# Patient Record
Sex: FEMALE | Race: WHITE | HISPANIC OR LATINO | Employment: FULL TIME | URBAN - METROPOLITAN AREA
[De-identification: names, ages, dates, MRNs, and addresses within clinical notes are randomized per-mention and may not be internally consistent; named-entity substitution may affect disease eponyms.]

---

## 2017-02-08 PROBLEM — F41.9 ANXIETY: Status: ACTIVE | Noted: 2017-02-08

## 2018-04-04 PROBLEM — Z34.90 TERM PREGNANCY: Status: ACTIVE | Noted: 2018-04-04

## 2018-06-06 PROBLEM — Z34.90 TERM PREGNANCY: Status: RESOLVED | Noted: 2018-04-04 | Resolved: 2018-06-06

## 2018-06-06 PROBLEM — I10 HYPERTENSION: Status: ACTIVE | Noted: 2018-06-06

## 2018-06-13 PROBLEM — R94.31 EKG, ABNORMAL: Status: ACTIVE | Noted: 2018-06-13

## 2018-06-18 PROBLEM — M26.609 TMJ (TEMPOROMANDIBULAR JOINT DISORDER): Status: ACTIVE | Noted: 2018-06-18

## 2018-06-20 PROBLEM — I10 UNCONTROLLED HYPERTENSION: Status: RESOLVED | Noted: 2018-06-06 | Resolved: 2018-06-20

## 2018-12-12 PROBLEM — R94.31 EKG, ABNORMAL: Status: RESOLVED | Noted: 2018-06-13 | Resolved: 2018-12-12

## 2018-12-12 PROBLEM — I10 ESSENTIAL HYPERTENSION: Status: RESOLVED | Noted: 2018-06-06 | Resolved: 2018-12-12

## 2019-01-25 PROBLEM — M54.50 LUMBAR PAIN: Status: ACTIVE | Noted: 2019-01-25

## 2019-01-25 PROBLEM — R63.5 WEIGHT GAIN: Status: ACTIVE | Noted: 2019-01-25

## 2019-02-11 PROBLEM — M41.9 SCOLIOSIS OF LUMBAR SPINE: Status: ACTIVE | Noted: 2019-02-11

## 2019-02-11 PROBLEM — M54.50 LUMBAR PAIN: Status: RESOLVED | Noted: 2019-01-25 | Resolved: 2019-02-11

## 2019-04-05 PROBLEM — Z80.0 FAMILY HISTORY OF COLON CANCER: Status: ACTIVE | Noted: 2019-04-05

## 2020-01-08 PROBLEM — M99.03 SOMATIC DYSFUNCTION OF LUMBAR REGION: Status: ACTIVE | Noted: 2020-01-08

## 2020-01-08 PROBLEM — M99.07 SOMATIC DYSFUNCTION OF BOTH UPPER EXTREMITIES: Status: ACTIVE | Noted: 2020-01-08

## 2020-01-08 PROBLEM — M99.01 SOMATIC DYSFUNCTION OF CERVICAL REGION: Status: ACTIVE | Noted: 2020-01-08

## 2020-01-08 PROBLEM — M99.00 CRANIAL SOMATIC DYSFUNCTION: Status: ACTIVE | Noted: 2020-01-08

## 2020-01-08 PROBLEM — M99.02 SOMATIC DYSFUNCTION OF THORACIC REGION: Status: ACTIVE | Noted: 2020-01-08

## 2020-01-08 PROBLEM — M99.04 SOMATIC DYSFUNCTION OF SACRAL REGION: Status: ACTIVE | Noted: 2020-01-08

## 2020-01-08 PROBLEM — M99.06 SOMATIC DYSFUNCTION OF BOTH LOWER EXTREMITIES: Status: ACTIVE | Noted: 2020-01-08

## 2020-01-08 PROBLEM — M99.08 SOMATIC DYSFUNCTION OF RIB CAGE REGION: Status: ACTIVE | Noted: 2020-01-08

## 2020-06-11 ENCOUNTER — PATIENT OUTREACH (OUTPATIENT)
Dept: OTHER | Facility: OTHER | Age: 35
End: 2020-06-11

## 2020-06-11 NOTE — LETTER
June 11, 2020     Daphne Bocanegra  [address] [facility] [address]       Dear Daphne,    Welcome to [facility]! Our goal is to make care effective, proactive and convenient by using data you send us from home to better treat your chronic conditions.              My name is Marylin Abdul, and I am your dedicated Digital Medicine clinician. As an expert in medication management, I will help ensure that the medications you are taking continue to provide the intended benefits and help you reach your goals. You can reach me directly at [phone] or by sending me a message directly through your [facility] account.      I am Melinda Shannon and I will be your health . My job is to help you identify lifestyle changes to improve your disease control. We will talk about nutrition, exercise, and other ways you may be able to adjust your current habits to better your health. Additionally, we will help ensure you are completing the tests and screenings that are necessary to help manage your conditions. You can reach me directly at [phone] or by sending me a message directly through your [facility] account.    Most importantly, YOU are at the center of this team. Together, we will work to improve your overall health and encourage you to meet your goals for a healthier lifestyle.     What we expect from YOU:  · Please take frequent home blood pressure measurements. We ask that you take at least 1 blood pressure reading per week, but more information will better help us get you know you. Be sure you rest for a few minutes before taking the reading in a quiet, comfortable place.     Be available to receive phone calls or [employer] messages, when appropriate, from your care team. Please let us know if there are any specific days or times that work best for us to reach you via phone.     Complete routine tests and screenings. Dont worry, we will help keep you on track!           What you  should expect from your Digital Medicine Care Team:   We will work with you to create a personalized plan of care and provide you with encouragement and education, including regarding lifestyle changes, that could help you manage your disease states.     We will adjust your current medications, if needed, and continue to monitor your long-term progress.     We will provide you and your physician with monthly progress reports after you have been in the program for more than 30 days.     We will send you reminders through [employer] and text messages to help ensure you do not miss any testing deadlines to help manage your disease states.    You will be able to reach us by phone or through your [facility] account by clicking our names under Care Team on the right side of the home screen.    I look forward to working with you to achieve your blood pressure goals!    We look forward to working with you to help manage your health,    Sincerely,    Your Digital Medicine Team    Please visit our websites to learn more:   · Hypertension: [email or web address]      Remember, we are not available for emergencies. If you have an emergency, please contact your doctors office directly or call [facility] ([phone] or [phone]) or [phone].

## 2020-06-11 NOTE — PROGRESS NOTES
"Digital Medicine: Health  Introduction    Introduced Daphne Bocanegra to Digital Medicine. Discussed health  role and recommended lifestyle modifications.    Patient reports she is well. Patient reports she did not receive BP cuff yet. BP reading in chart was manually entered from other BP cuff. Placed CRM. Reports  is also enrolling into the program and has not yet received BP cuff. Will place CRM for him as well.     Reports she is interested in discussing lifestyle modifications to control BP and would eventually like to reduce BP medication. Recently started on appetite suppressant. Reports maintaining healthy lifestyle habits have been a challenge during the pandemic, limited activity and poor eating habits. Endorsed efforts to "get back on track" with this. Will call back in 1 week to specifically address eating habits and health goals.    The history is provided by the patient.     HYPERTENSION  Our goal is to get BP to consistently below 130/80mmHg and make the process convenient so patient can avoid extra trips to the office. Getting your blood pressure below 130/80mmHg (definition of control) will reduce your risk for heart attack, kidney failure, stroke and death (as well as kidney failure, eye disease, & dementia)      Reviewed that the Digital Medicine care team - consisting of a clinician and a health  - will follow the most current evidence-based national guidelines for treating your condition.  The health  will focus on lifestyle modifications and motivation while the clinician will focus on medication therapy.  The care team will review all data on a regular basis and reach out as needed.      Explained that one of the key parts of the program is communication with the care team.  Asked patient to respond to outreach attempts and complete questionnaires.  Stressed importance of medication adherence.    Reviewed non-pharmacologic therapies and impact on BP.      Explained " that we expect patient to obtain several blood pressures per week at random times of day.  Instructed patient not to allow anyone else to use phone and monitoring device.  Confirmed appropriate BP monitoring technique.      Explained to patient that the digital medicine team is not available for emergencies.  Patient will call [facility] on-call ([phone] or [phone]) or [phone] if needed.      Patient's BP goal is 130/80.Patient's BP average is 114/71 mmHg, which is above goal, per 2017 ACC/AHA Hypertension Guidelines.          Last 5 Patient Entered Readings                                      Current 30 Day Average: 114/71     Recent Readings 6/10/2020    SBP (mmHg) 114    DBP (mmHg) 71            INTERVENTION(S)  reviewed monitoring technique, encouragement/support, denied further coaching and denied questions    PLAN  patient verbalizes understanding, demonstrates understanding via teach back, patient amenable to changes, patient unable to make changes at this time and continue monitoring      There are no preventive care reminders to display for this patient.    Reviewed the importance of self-monitoring, medication adherence, and that the health  can be used as a resource for lifestyle modifications to help reduce or maintain a healthy lifestyle.    Sent link to [facility] webpages and my contact information via [employer] for future questions. Follow up scheduled.         Screenings    SDOH

## 2020-06-24 ENCOUNTER — PATIENT OUTREACH (OUTPATIENT)
Dept: OTHER | Facility: OTHER | Age: 35
End: 2020-06-24

## 2020-06-24 NOTE — PROGRESS NOTES
Digital Medicine: Health  Follow-Up    Patient reports she is well, no complaints. Reports one day she decided she needed to make healthier lifestyle choices. Reports her parents were both alcoholics and did not care for himself, reports she wants to be a better example for her kids.     The history is provided by the patient.   Follow Up  Follow-up reason(s): reading review and routine education      Readings are trending down due to joined [employer] .        INTERVENTION(S)  recommended diet modifications, recommend physical activity, encouragement/support, goal setting, denied further coaching, denied resources and denied questions    PLAN  patient verbalizes understanding, demonstrates understanding via teach back, patient amenable to changes and continue monitoring      There are no preventive care reminders to display for this patient.    Last 5 Patient Entered Readings                                      Current 30 Day Average: 118/70     Recent Readings 6/23/2020 6/10/2020    SBP (mmHg) 121 114    DBP (mmHg) 69 71    Pulse 98 -                      Diet Screening   Breakfast is typically between. Banana.  Lunch is typically between. Hamburger bun with pulled pork, [employer] .    Dinner is typically between. Wheat bread, peanut butter and jelly.    Patient reports eating or drinking the following: fruit, water, frozen meals, fresh vegetables, packaged/processed foods and turkey sausage    The patient drinks 102 ounces of water per day.    She has the following dietary restrictions: weight-loss and low sodium dietShe has meals prepared by family and  .    Patient does the shopping for groceries and lets family grocery shop.  She gets groceries from the grocery store.      Patient reports she started [employer] June 15th, already lost 8 lbs. Reports her  is doing it with her as well, encouraged by his support. Reports she has been tracking her food intake with [employer]. Reports she has been reading food labels. Reports she had a coworker that has had success with [employer] so she decided to do it. Reports she typically does not add salt to her meals.     Reports she does well to maintain adequate water intake, purchases large cases of water bottles.         Physical Activity Screening   When asked if exercising, patient responded: yes    Patient participates in the following activities: walking    Reports she recently purchased a FitBit to monitor steps, HR. Reports she would like to increase her activity level. Enjoys fast pace walking. Reports efforts to stay active at work. Discussed idea to walk 30 minutes after work with her coworkers.     Set goal to walk 30 minutes per day, 3 days per week. Will assess this next call.     Intervention(s): goal setting       SDOH

## 2020-06-29 ENCOUNTER — PATIENT OUTREACH (OUTPATIENT)
Dept: OTHER | Facility: OTHER | Age: 35
End: 2020-06-29

## 2020-06-29 NOTE — PROGRESS NOTES
Called to update patient on device shipment. Sent tracking numbers via [employer] per patient request.

## 2020-07-22 ENCOUNTER — PATIENT OUTREACH (OUTPATIENT)
Dept: OTHER | Facility: OTHER | Age: 35
End: 2020-07-22

## 2020-07-30 NOTE — PROGRESS NOTES
"Digital Medicine: Health  Follow-Up    Patient reports she is well, no complaints. Denies symptoms of hypertension- HA, chest pains and SOB.     Discussed COVID-19 and importance of proper hand hygiene and social distancing. Going back into the office now.Reports she was worried that she had COVID-19, but tested negative for the virus.     Reports she would like to adjust or possibly lower her BP medication. Placed task for DM clinician to address.       The history is provided by the patient.             Reason for review: Blood pressure not at goal        Topics Covered on Call: physical activity, Diet and sleep           Diet-Change    Patient reports eating or drinking the following: Doing Weight [employer]. Reports she lost a total of 13 lbs.  has lost 17 lbs Drinking "plenty of water".         Dietary Improvements:Reports she eating pickles lately, making efforts to limit this. Reading food labels, looking for sodium content. Eating Healthy Choices meals, Fiber One and frozen fruit instead of sweets.    Dietary Indiscretions:        Additional diet details:    Physical Activity-Change      Additional physical activity details: Reports she increased her cardio, power walking.  has been going with her. Using a punching bag at her house as well.       Medication Adherence-Medication adherence was asssessed.  Patient continue taking medication as prescribed.          Substance, Sleep, Stress-change  stress-not assessed  Details:  Intervention(s):    Sleep-assessed  Details:sleeping "okay". night owl. sleeping 6-7 hours per night.   Intervention(s):    Alcohol -not assessed  Details:  Intervention(s):    Tobacco-Not Assessed  Details:  Intervention(s):        PLAN  Instructed to charge device:  Provided patient education:  Reviewed Device Techniques  Patient verbalizes understanding. Patient did not express questions or concerns and patient has contact information if needed.    Explained the " importance of self-monitoring and medication adherence. Encouraged the patient to communicate with their health  for lifestyle modifications to help improve or maintain a healthy lifestyle.        There are no preventive care reminders to display for this patient.    Last 5 Patient Entered Readings                                      Current 30 Day Average: 122/83     Recent Readings 7/29/2020 7/23/2020 7/15/2020 7/11/2020 6/30/2020    SBP (mmHg) 117 131 112 132 116    DBP (mmHg) 83 85 74 92 82    Pulse 89 99 100 95 94

## 2020-08-04 ENCOUNTER — PATIENT OUTREACH (OUTPATIENT)
Dept: OTHER | Facility: OTHER | Age: 35
End: 2020-08-04

## 2020-08-04 DIAGNOSIS — I10 ESSENTIAL HYPERTENSION: Primary | ICD-10-CM

## 2020-08-11 NOTE — PROGRESS NOTES
Digital Medicine: Clinician Introduction    Daphne Bocanegra is a 34 y.o. female who is newly enrolled in the Digital Medicine Clinic.    Called to welcome patient to [facility] and to introduce myself. Reviewed goals of therapy, medication list/allergies, monitoring requirements and technique with patient. Patient states she would like to get off the BP meds. Has joined [employer], doing 3-4 times a week of cardio exercise. Has lost 17 lbs lately. No side effects to the medications. She is watching watch what she eats and sodium.    The history is provided by the patient.      Review of patient's allergies indicates:   -- Percocet (oxycodone-acetaminophen) -- Anaphylaxis    --  Tongue swelling   -- Vicodin (hydrocodone-acetaminophen) -- Anaphylaxis  Completed Medication Reconciliation  Verified pharmacy information.    HYPERTENSION  Explained hypertension digital medicine goals including BP goal less than or equal to 130/80mmHg, improved convenience of BP management and reduced risk of heart attack, kidney failure, stroke, eye disease, dementia, and death.     Explained non-pharmacologic therapies like low salt diet and physical activity can reduce blood pressure.       Explained that we expect patient to submit several blood pressure readings per week at random times of the day, but at least 30 minutes after taking blood pressure medications. Instructed patient not to allow anyone else to use their blood pressure monitor and phone as data submitted is directly entered into medical record. Reviewed and confirmed appropriate blood pressure monitoring technique.         Reviewed signs/symptoms of hypertension (headache, changes in vision, chest pain, shortness of breath)   Reviewed signs/symptoms of hypotension (lightheaded, dizziness, weakness)     Patient's BP goal is less than or equal to 130/80. Patients BP average is 118/80 mmHg, which is at goal, per 2017 ACC/AHA Hypertension Guidelines.       Last 5  Patient Entered Readings                                      Current 30 Day Average: 118/80     Recent Readings 8/6/2020 7/29/2020 7/23/2020 7/15/2020 7/11/2020    SBP (mmHg) 113 117 131 112 132    DBP (mmHg) 78 83 85 74 92    Pulse 109 89 99 100 95              Depression Screening  Daphne Bocanegra screened negative on the depression screening.     Sleep Apnea Screening  Patient not previously diagnosed with MARLEN       Medication Affordability Screening  Patient did not answer the medication affordability questionnaires. Patient is currently not having problems affording medications          ASSESSMENT(S)  Patients BP average is 118/80 mmHg, which is at goal. Patient's BP goal is less than or equal to 130/80 per 2017 ACC/AHA Hypertension Guidelines.  Patients BP average is 118/80 mmHg, which is above goal. Patient's BP goal is less than or equal to 130/80 per 2017 ACC/AHA Hypertension Guidelines.       Hypertension Plan  Continue current therapy.    Continue current diet/physical activity routine. Patient would like to come off of BP medications. Encouraged her to continue losing weight and watching her Na intake. She was also encouraged to increase activity to be able to maintain her BP at a lower level.       Addressed any questions or concerns and patient has my contact information if needed prior to next outreach. Patient verbalizes understanding.      Explained the importance of self-monitoring and medication adherence. Encouraged the patient to communicate with their health  for lifestyle modifications to help improve or maintain a healthy lifestyle.        Sent link to [facility] webpages and my contact information via [employer] for future questions.        Explained to the patient that the Digital Medicine team is not available for emergencies. Advised patient call [facility] ([phone] or [phone]) or [phone] if needed.         There are no preventive care reminders  to display for this patient.    Current Medication Regimen:  Hypertension Medications             hydroCHLOROthiazide (HYDRODIURIL) 12.5 MG Tab TAKE 1 TABLET BY MOUTH EVERY DAY    lisinopriL (PRINIVIL,ZESTRIL) 20 MG tablet TAKE 1 TABLET BY MOUTH EVERY DAY

## 2020-08-27 ENCOUNTER — PATIENT OUTREACH (OUTPATIENT)
Dept: OTHER | Facility: OTHER | Age: 35
End: 2020-08-27

## 2020-09-14 NOTE — PROGRESS NOTES
"Digital Medicine: Health  Follow-Up    The history is provided by the patient.             Reason for review: Blood pressure not at goal        Topics Covered on Call: physical activity, Diet and sleep     Additional Follow-up details: Reports she has been losing weight over the last few weeks, current weight of 176 lbs.     Reports she still feels really motivated to continue lifestyle changes.         Diet-Change  24 hour dietary recall  Breakfast is typically between. Turkey sausage, cheese, Samy Irineo .  Lunch is typically between. Leftovers.   Dinner is typically between. Pork chop, vegetales, rice pilaf; whole wheat pasta.   Snacks are typically. Apple, side salad with boiled egg .    Patient reports eating or drinking the following: Focused on eating more fruits and vegetables, drinking a lot of water, watching sodium intake, still doing Weight Watchers. Doing really well to maintain healthy eating habits. Reports her  cooks a lot, he is also in the program.     Reports she is just trying to keep herself busy, to avoid "bored snacking".       Physical Activity-Change      Additional physical activity details: Power walking, jogging, squats, lunges, swimming/water aerobics, yoga/pilates. Working to tone her abdominal muscles. Reports her shoulder hurts, so limited activity recently.           Medication Adherence-Medication adherence was assessed.        Substance, Sleep, Stress-change  stress-not assessed  Details:  Intervention(s):    Sleep-assessed  Details:sleeping 6-7 hours per night  Intervention(s):    Alcohol -not assessed  Details:  Intervention(s):    Tobacco-Not Assessed  Details:  Intervention(s):          Continue current diet/physical activity routine.  Provided patient education.       Addressed any questions or concerns and patient has my contact information if needed prior to next outreach. Patient verbalizes understanding.      Explained the importance of self-monitoring and " medication adherence. Encouraged the patient to communicate with their health  for lifestyle modifications to help improve or maintain a healthy lifestyle.            There are no preventive care reminders to display for this patient.    Last 5 Patient Entered Readings                                      Current 30 Day Average: 121/83     Recent Readings 9/4/2020 8/27/2020 8/27/2020 8/25/2020 8/22/2020    SBP (mmHg) 119 130 123 120 118    DBP (mmHg) 84 88 77 84 85    Pulse 98 84 87 88 91

## 2020-10-26 ENCOUNTER — PATIENT OUTREACH (OUTPATIENT)
Dept: OTHER | Facility: OTHER | Age: 35
End: 2020-10-26

## 2020-11-02 ENCOUNTER — PATIENT OUTREACH (OUTPATIENT)
Dept: OTHER | Facility: OTHER | Age: 35
End: 2020-11-02

## 2020-12-01 NOTE — PROGRESS NOTES
"Digital Medicine: Health  Follow-Up    The history is provided by the patient.             Reason for review: Blood pressure at goal        Topics Covered on Call: physical activity and Diet    Additional Follow-up details: Patient reports she is well, no complaints. Denies symptoms of hypertension- HA, chest pains and SOB. BP consistently at goal- 116/77    Discussed COVID-19 and importance of proper hand hygiene and social distancing.     Patient denies further needs from the program today.                 Diet-Change    Patient reports eating or drinking the following: Reports she has continued low-sodium diet. Reports she has been choosing better snack choices, lower sugar options like fruit or salad. Reports she has been refocusing on her health for herself and her children.       Physical Activity-Change      Additional physical activity details: Still active, just not "as active" as before because she has been doing home renovations. Exercising 2-3 days per week. Reports she would still like to lose 15 lbs. Spent some time discussing this.         Medication Adherence-Medication adherence was asssessed.        Reports she is off of her diet medication, d/c 2 weeks ago. Reports she was able to lose weight, as these medications helped with appetite. Placed task for DM clinician to address.     Substance, Sleep, Stress-change  stress-assessed  Details:"up and down" stress levels with pandemic, warm bath/shower and heating pad help   Intervention(s):    Sleep-assessed  Details:sleeping well, 6-8 hours per night   Intervention(s):    Alcohol -not assessed  Details:  Intervention(s):    Tobacco-Not Assessed  Details:  Intervention(s):          Continue current diet/physical activity routine.  Provided patient education.       Addressed patient questions and patient has my contact information if needed prior to next outreach. Patient verbalizes understanding.      Explained the importance of self-monitoring and " medication adherence. Encouraged the patient to communicate with their health  for lifestyle modifications to help improve or maintain a healthy lifestyle.               There are no preventive care reminders to display for this patient.      Last 5 Patient Entered Readings                                      Current 30 Day Average: 116/77     Recent Readings 11/30/2020 11/28/2020 11/26/2020 11/24/2020 11/23/2020    SBP (mmHg) 116 115 114 116 118    DBP (mmHg) 74 74 76 74 71    Pulse 74 72 74 74 72

## 2020-12-07 ENCOUNTER — PATIENT OUTREACH (OUTPATIENT)
Dept: OTHER | Facility: OTHER | Age: 35
End: 2020-12-07

## 2020-12-07 DIAGNOSIS — I10 ESSENTIAL HYPERTENSION: Primary | ICD-10-CM

## 2020-12-07 NOTE — PROGRESS NOTES
Digital Medicine: Clinician Follow-Up    Called patient for routine follow up on blood pressure. Patient reports adherence to medication regimen daily and denies missed doses. Patient denies hypotensive s/s (lightheadedness, dizziness, nausea, fatigue); patient denies hypertensive s/s (SOB, CP, severe headaches, changes in vision, dizziness, fatigue).  Reports making better choices with diet, watching Na, checking BP daily, stopped diet medications and wanted to know if she can try to reduce the BP medication. She would like to be on as few medications as possible. Discussed what may happen when she stops HCTZ and the possibility of needing to restart the medication if her BP goes up too much.      The history is provided by the patient.   Follow-up reason(s): routine follow up.     Hypertension    Patient's blood pressure is stable.   Patient is not experiencing signs/symptoms of hypotension.  Patient is not experiencing signs/symptoms of hypertension.            Last 5 Patient Entered Readings                                      Current 30 Day Average: 116/76     Recent Readings 11/30/2020 11/28/2020 11/26/2020 11/24/2020 11/23/2020    SBP (mmHg) 116 115 114 116 118    DBP (mmHg) 74 74 76 74 71    Pulse 74 72 74 74 72                 Depression Screening  Daphne Bocanegra screened negative on the depression screening.     Sleep Apnea Screening  Patient not previously diagnosed with MARLEN       Medication Affordability Screening  Patient did not answer the medication affordability questionnaires.           ASSESSMENT(S)  Patients BP average is 116/76 mmHg, which is at goal. Patient's BP goal is less than or equal to 130/80.    Hypertension Plan  Hypertension Medication Change. D/c HCTZ if BP is above 140/90 at follow up consider increasing lisinopril to 30mg daily  Labs ordered. BMP Expected Lab Date: 12/21/2020  Continue current diet/physical activity routine.       Addressed patient questions and patient  has my contact information if needed prior to next outreach. Patient verbalizes understanding.      Explained to the patient that the Digital Medicine team is not available for emergencies. Advised patient call [facility] On [facility] ([phone] or [phone]) or [phone] if needed.            There are no preventive care reminders to display for this patient.  There are no preventive care reminders to display for this patient.      Hypertension Medications             hydroCHLOROthiazide (HYDRODIURIL) 12.5 MG Tab TAKE 1 TABLET BY MOUTH EVERY DAY    lisinopriL (PRINIVIL,ZESTRIL) 20 MG tablet TAKE 1 TABLET BY MOUTH EVERY DAY

## 2020-12-21 ENCOUNTER — PATIENT OUTREACH (OUTPATIENT)
Dept: OTHER | Facility: OTHER | Age: 35
End: 2020-12-21

## 2020-12-28 ENCOUNTER — PATIENT MESSAGE (OUTPATIENT)
Dept: OTHER | Facility: OTHER | Age: 35
End: 2020-12-28

## 2020-12-28 NOTE — PROGRESS NOTES
Digital Medicine: Clinician Follow-Up    The history is provided by the patient.   Follow-up reason(s): lab follow up and routine follow up.     Hypertension    Readings are trending up due to lifestyle change and stress - she recently lost her grandmother.    Patient is not experiencing signs/symptoms of hypotension.  Patient is not experiencing signs/symptoms of hypertension.            Last 5 Patient Entered Readings                                      Current 30 Day Average: 113/75     Recent Readings 12/20/2020 12/19/2020 12/18/2020 12/17/2020 12/16/2020    SBP (mmHg) 112 116 113 110 116    DBP (mmHg) 78 79 78 74 78    Pulse 92 82 86 88 85                 Depression Screening  Did not address depression screening.    Sleep Apnea Screening    Did not address sleep apnea screening.     Medication Affordability Screening  Did not address medication affordability screening.           ASSESSMENT(S)  Patients BP average is 113/75 mmHg, which is at goal. Patient's BP goal is less than or equal to 130/80.    Hypertension Plan  Continue current therapy. Consider reducing to lisinopril 10mg daily when stressful period passes  Continue current diet/physical activity routine.  Patient will also send the name of the birth control pill that she is on via [employer]     Addressed patient questions and patient has my contact information if needed prior to next outreach. Patient verbalizes understanding.      Explained to the patient that the Digital Medicine team is not available for emergencies. Advised patient call [facility] ([phone] or [phone]) or [phone] if needed.            There are no preventive care reminders to display for this patient.  There are no preventive care reminders to display for this patient.      Hypertension Medications             lisinopriL (PRINIVIL,ZESTRIL) 20 MG tablet TAKE 1 TABLET BY MOUTH EVERY DAY

## 2020-12-31 ENCOUNTER — CLINICAL SUPPORT (OUTPATIENT)
Dept: URGENT CARE | Facility: CLINIC | Age: 35
End: 2020-12-31
Payer: COMMERCIAL

## 2020-12-31 DIAGNOSIS — R52 BODY ACHES: Primary | ICD-10-CM

## 2020-12-31 LAB
CTP QC/QA: YES
SARS-COV-2 RDRP RESP QL NAA+PROBE: NEGATIVE

## 2020-12-31 PROCEDURE — U0002 COVID-19 LAB TEST NON-CDC: HCPCS | Mod: QW,S$GLB,, | Performed by: FAMILY MEDICINE

## 2020-12-31 PROCEDURE — U0002: ICD-10-PCS | Mod: QW,S$GLB,, | Performed by: FAMILY MEDICINE

## 2021-03-03 ENCOUNTER — PATIENT MESSAGE (OUTPATIENT)
Dept: OTHER | Facility: OTHER | Age: 36
End: 2021-03-03

## 2021-04-21 ENCOUNTER — PATIENT MESSAGE (OUTPATIENT)
Dept: OTHER | Facility: OTHER | Age: 36
End: 2021-04-21

## 2021-07-20 ENCOUNTER — PATIENT MESSAGE (OUTPATIENT)
Dept: OTHER | Facility: OTHER | Age: 36
End: 2021-07-20

## 2022-07-06 PROBLEM — R09.81 NASAL CONGESTION: Status: ACTIVE | Noted: 2022-07-06

## 2022-07-06 PROBLEM — H69.93 DYSFUNCTION OF BOTH EUSTACHIAN TUBES: Status: ACTIVE | Noted: 2022-07-06

## 2022-07-06 PROBLEM — H90.12 CONDUCTIVE HEARING LOSS IN LEFT EAR: Status: ACTIVE | Noted: 2022-07-06

## 2022-07-06 PROBLEM — J30.1 ALLERGIC RHINITIS DUE TO POLLEN: Status: ACTIVE | Noted: 2022-07-06

## 2022-07-06 PROBLEM — J34.3 HYPERTROPHY OF NASAL TURBINATES: Status: ACTIVE | Noted: 2022-07-06

## 2022-07-06 PROBLEM — J35.2 HYPERTROPHY OF ADENOIDS ALONE: Status: ACTIVE | Noted: 2022-07-06

## 2022-07-06 PROBLEM — J34.2 DEVIATED NASAL SEPTUM: Status: ACTIVE | Noted: 2022-07-06

## 2023-08-21 PROBLEM — R07.9 CHEST PAIN: Status: ACTIVE | Noted: 2023-08-21

## 2023-10-17 ENCOUNTER — PATIENT MESSAGE (OUTPATIENT)
Dept: PSYCHIATRY | Facility: CLINIC | Age: 38
End: 2023-10-17
Payer: COMMERCIAL

## 2023-11-28 ENCOUNTER — TELEPHONE (OUTPATIENT)
Dept: PSYCHIATRY | Facility: CLINIC | Age: 38
End: 2023-11-28
Payer: COMMERCIAL

## 2023-12-05 ENCOUNTER — OFFICE VISIT (OUTPATIENT)
Dept: PSYCHIATRY | Facility: CLINIC | Age: 38
End: 2023-12-05
Payer: COMMERCIAL

## 2023-12-05 VITALS
HEIGHT: 64 IN | WEIGHT: 146.25 LBS | HEART RATE: 76 BPM | BODY MASS INDEX: 24.97 KG/M2 | SYSTOLIC BLOOD PRESSURE: 152 MMHG | DIASTOLIC BLOOD PRESSURE: 108 MMHG

## 2023-12-05 DIAGNOSIS — F32.A DEPRESSION, UNSPECIFIED DEPRESSION TYPE: ICD-10-CM

## 2023-12-05 DIAGNOSIS — F41.9 ANXIETY: ICD-10-CM

## 2023-12-05 DIAGNOSIS — F41.1 GAD (GENERALIZED ANXIETY DISORDER): Primary | ICD-10-CM

## 2023-12-05 PROCEDURE — 3077F SYST BP >= 140 MM HG: CPT | Mod: CPTII,S$GLB,, | Performed by: NURSE PRACTITIONER

## 2023-12-05 PROCEDURE — 3080F DIAST BP >= 90 MM HG: CPT | Mod: CPTII,S$GLB,, | Performed by: NURSE PRACTITIONER

## 2023-12-05 PROCEDURE — 3077F PR MOST RECENT SYSTOLIC BLOOD PRESSURE >= 140 MM HG: ICD-10-PCS | Mod: CPTII,S$GLB,, | Performed by: NURSE PRACTITIONER

## 2023-12-05 PROCEDURE — 99999 PR PBB SHADOW E&M-EST. PATIENT-LVL IV: CPT | Mod: PBBFAC,,, | Performed by: NURSE PRACTITIONER

## 2023-12-05 PROCEDURE — 3080F PR MOST RECENT DIASTOLIC BLOOD PRESSURE >= 90 MM HG: ICD-10-PCS | Mod: CPTII,S$GLB,, | Performed by: NURSE PRACTITIONER

## 2023-12-05 PROCEDURE — 1159F MED LIST DOCD IN RCRD: CPT | Mod: CPTII,S$GLB,, | Performed by: NURSE PRACTITIONER

## 2023-12-05 PROCEDURE — 1159F PR MEDICATION LIST DOCUMENTED IN MEDICAL RECORD: ICD-10-PCS | Mod: CPTII,S$GLB,, | Performed by: NURSE PRACTITIONER

## 2023-12-05 PROCEDURE — 90792 PR PSYCHIATRIC DIAGNOSTIC EVALUATION W/MEDICAL SERVICES: ICD-10-PCS | Mod: S$GLB,,, | Performed by: NURSE PRACTITIONER

## 2023-12-05 PROCEDURE — 99999 PR PBB SHADOW E&M-EST. PATIENT-LVL IV: ICD-10-PCS | Mod: PBBFAC,,, | Performed by: NURSE PRACTITIONER

## 2023-12-05 PROCEDURE — 1160F PR REVIEW ALL MEDS BY PRESCRIBER/CLIN PHARMACIST DOCUMENTED: ICD-10-PCS | Mod: CPTII,S$GLB,, | Performed by: NURSE PRACTITIONER

## 2023-12-05 PROCEDURE — 90792 PSYCH DIAG EVAL W/MED SRVCS: CPT | Mod: S$GLB,,, | Performed by: NURSE PRACTITIONER

## 2023-12-05 PROCEDURE — 1160F RVW MEDS BY RX/DR IN RCRD: CPT | Mod: CPTII,S$GLB,, | Performed by: NURSE PRACTITIONER

## 2023-12-05 RX ORDER — FLUOXETINE HYDROCHLORIDE 20 MG/1
20 CAPSULE ORAL DAILY
Qty: 30 CAPSULE | Refills: 2 | Status: SHIPPED | OUTPATIENT
Start: 2023-12-05 | End: 2024-01-13
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
## 2023-12-08 ENCOUNTER — PATIENT MESSAGE (OUTPATIENT)
Dept: PSYCHIATRY | Facility: CLINIC | Age: 38
End: 2023-12-08
Payer: COMMERCIAL

## 2024-01-05 ENCOUNTER — PATIENT MESSAGE (OUTPATIENT)
Dept: PSYCHIATRY | Facility: CLINIC | Age: 39
End: 2024-01-05
Payer: COMMERCIAL

## 2024-01-12 ENCOUNTER — TELEPHONE (OUTPATIENT)
Dept: PSYCHIATRY | Facility: CLINIC | Age: 39
End: 2024-01-12
Payer: COMMERCIAL

## 2024-01-13 RX ORDER — FLUOXETINE HYDROCHLORIDE 20 MG/1
20 CAPSULE ORAL DAILY
Qty: 90 CAPSULE | Refills: 0 | Status: SHIPPED | OUTPATIENT
Start: 2024-01-13 | End: 2024-01-17 | Stop reason: ALTCHOICE

## 2024-01-17 ENCOUNTER — TELEPHONE (OUTPATIENT)
Dept: PSYCHIATRY | Facility: CLINIC | Age: 39
End: 2024-01-17
Payer: COMMERCIAL

## 2024-01-17 ENCOUNTER — OFFICE VISIT (OUTPATIENT)
Dept: PSYCHIATRY | Facility: CLINIC | Age: 39
End: 2024-01-17
Payer: COMMERCIAL

## 2024-01-17 DIAGNOSIS — F41.1 GAD (GENERALIZED ANXIETY DISORDER): Primary | ICD-10-CM

## 2024-01-17 DIAGNOSIS — F33.1 MODERATE EPISODE OF RECURRENT MAJOR DEPRESSIVE DISORDER: ICD-10-CM

## 2024-01-17 PROCEDURE — 99214 OFFICE O/P EST MOD 30 MIN: CPT | Mod: 95,,, | Performed by: NURSE PRACTITIONER

## 2024-01-17 RX ORDER — DESVENLAFAXINE SUCCINATE 25 MG/1
25 TABLET, EXTENDED RELEASE ORAL DAILY
Qty: 30 TABLET | Refills: 0 | Status: SHIPPED | OUTPATIENT
Start: 2024-01-17 | End: 2024-02-09
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
## 2024-02-09 ENCOUNTER — PATIENT MESSAGE (OUTPATIENT)
Dept: PSYCHIATRY | Facility: CLINIC | Age: 39
End: 2024-02-09
Payer: COMMERCIAL

## 2024-02-09 RX ORDER — DESVENLAFAXINE SUCCINATE 25 MG/1
1 TABLET, EXTENDED RELEASE ORAL
Qty: 90 TABLET | Refills: 1 | Status: SHIPPED | OUTPATIENT
Start: 2024-02-09 | End: 2024-02-27

## 2024-02-09 NOTE — TELEPHONE ENCOUNTER
Last ordered: 3 weeks ago (1/17/2024) by Hood Covington NP     Last refill: 1/17/2024        Pharmacy comment: REQUEST FOR 90 DAYS PRESCRIPTION.       Nov none   Lov 1/17/24

## 2024-02-27 ENCOUNTER — PATIENT MESSAGE (OUTPATIENT)
Dept: PSYCHIATRY | Facility: CLINIC | Age: 39
End: 2024-02-27
Payer: COMMERCIAL

## 2024-02-27 ENCOUNTER — OFFICE VISIT (OUTPATIENT)
Dept: PSYCHIATRY | Facility: CLINIC | Age: 39
End: 2024-02-27
Payer: COMMERCIAL

## 2024-02-27 DIAGNOSIS — F41.1 GAD (GENERALIZED ANXIETY DISORDER): ICD-10-CM

## 2024-02-27 DIAGNOSIS — F33.1 MODERATE EPISODE OF RECURRENT MAJOR DEPRESSIVE DISORDER: Primary | ICD-10-CM

## 2024-02-27 PROCEDURE — 99214 OFFICE O/P EST MOD 30 MIN: CPT | Mod: 95,,, | Performed by: NURSE PRACTITIONER

## 2024-02-27 RX ORDER — DESVENLAFAXINE SUCCINATE 50 MG/1
50 TABLET, EXTENDED RELEASE ORAL DAILY
Qty: 30 TABLET | Refills: 2 | Status: SHIPPED | OUTPATIENT
Start: 2024-02-27 | End: 2024-04-23
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
## 2024-04-23 RX ORDER — DESVENLAFAXINE SUCCINATE 50 MG/1
50 TABLET, EXTENDED RELEASE ORAL
Qty: 90 TABLET | Refills: 1 | Status: SHIPPED | OUTPATIENT
Start: 2024-04-23

## 2024-05-27 ENCOUNTER — TELEPHONE (OUTPATIENT)
Dept: PSYCHIATRY | Facility: CLINIC | Age: 39
End: 2024-05-27
Payer: COMMERCIAL

## 2024-05-29 ENCOUNTER — OFFICE VISIT (OUTPATIENT)
Dept: PSYCHIATRY | Facility: CLINIC | Age: 39
End: 2024-05-29
Payer: COMMERCIAL

## 2024-05-29 DIAGNOSIS — F41.1 GAD (GENERALIZED ANXIETY DISORDER): ICD-10-CM

## 2024-05-29 DIAGNOSIS — F33.1 MODERATE EPISODE OF RECURRENT MAJOR DEPRESSIVE DISORDER: Primary | ICD-10-CM

## 2024-05-29 PROCEDURE — 1160F RVW MEDS BY RX/DR IN RCRD: CPT | Mod: CPTII,95,, | Performed by: NURSE PRACTITIONER

## 2024-05-29 PROCEDURE — 99214 OFFICE O/P EST MOD 30 MIN: CPT | Mod: 95,,, | Performed by: NURSE PRACTITIONER

## 2024-05-29 PROCEDURE — 1159F MED LIST DOCD IN RCRD: CPT | Mod: CPTII,95,, | Performed by: NURSE PRACTITIONER

## 2024-05-29 RX ORDER — DESVENLAFAXINE SUCCINATE 25 MG/1
25 TABLET, EXTENDED RELEASE ORAL DAILY
Qty: 30 TABLET | Refills: 2 | Status: SHIPPED | OUTPATIENT
Start: 2024-05-29
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
## 2024-06-01 ENCOUNTER — PATIENT MESSAGE (OUTPATIENT)
Dept: PSYCHIATRY | Facility: CLINIC | Age: 39
End: 2024-06-01
Payer: COMMERCIAL

## 2024-06-27 ENCOUNTER — OFFICE VISIT (OUTPATIENT)
Dept: PSYCHIATRY | Facility: CLINIC | Age: 39
End: 2024-06-27
Payer: COMMERCIAL

## 2024-06-27 VITALS
HEIGHT: 64 IN | BODY MASS INDEX: 27.68 KG/M2 | HEART RATE: 58 BPM | DIASTOLIC BLOOD PRESSURE: 95 MMHG | WEIGHT: 162.13 LBS | SYSTOLIC BLOOD PRESSURE: 151 MMHG

## 2024-06-27 DIAGNOSIS — F41.1 GAD (GENERALIZED ANXIETY DISORDER): ICD-10-CM

## 2024-06-27 DIAGNOSIS — F33.1 MODERATE EPISODE OF RECURRENT MAJOR DEPRESSIVE DISORDER: Primary | ICD-10-CM

## 2024-06-27 PROCEDURE — 99999 PR PBB SHADOW E&M-EST. PATIENT-LVL IV: CPT | Mod: PBBFAC,,, | Performed by: NURSE PRACTITIONER

## 2024-06-27 PROCEDURE — 1160F RVW MEDS BY RX/DR IN RCRD: CPT | Mod: CPTII,S$GLB,, | Performed by: NURSE PRACTITIONER

## 2024-06-27 PROCEDURE — 3008F BODY MASS INDEX DOCD: CPT | Mod: CPTII,S$GLB,, | Performed by: NURSE PRACTITIONER

## 2024-06-27 PROCEDURE — 3080F DIAST BP >= 90 MM HG: CPT | Mod: CPTII,S$GLB,, | Performed by: NURSE PRACTITIONER

## 2024-06-27 PROCEDURE — 3077F SYST BP >= 140 MM HG: CPT | Mod: CPTII,S$GLB,, | Performed by: NURSE PRACTITIONER

## 2024-06-27 PROCEDURE — 1159F MED LIST DOCD IN RCRD: CPT | Mod: CPTII,S$GLB,, | Performed by: NURSE PRACTITIONER

## 2024-06-27 PROCEDURE — 99214 OFFICE O/P EST MOD 30 MIN: CPT | Mod: S$GLB,,, | Performed by: NURSE PRACTITIONER

## 2024-06-27 RX ORDER — VILAZODONE HYDROCHLORIDE 20 MG/1
TABLET ORAL
Qty: 30 TABLET | Refills: 2 | Status: SHIPPED | OUTPATIENT
Start: 2024-06-27

## 2024-06-27 RX ORDER — VILAZODONE HYDROCHLORIDE 10 MG/1
10 TABLET ORAL DAILY
Qty: 7 TABLET | Refills: 0 | Status: SHIPPED | OUTPATIENT
Start: 2024-06-27 | End: 2025-06-27
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
## 2024-08-06 ENCOUNTER — PATIENT MESSAGE (OUTPATIENT)
Dept: PSYCHIATRY | Facility: CLINIC | Age: 39
End: 2024-08-06
Payer: COMMERCIAL

## 2024-08-08 ENCOUNTER — OFFICE VISIT (OUTPATIENT)
Dept: PSYCHIATRY | Facility: CLINIC | Age: 39
End: 2024-08-08
Payer: COMMERCIAL

## 2024-08-08 DIAGNOSIS — F33.1 MODERATE EPISODE OF RECURRENT MAJOR DEPRESSIVE DISORDER: Primary | ICD-10-CM

## 2024-08-08 DIAGNOSIS — F41.1 GAD (GENERALIZED ANXIETY DISORDER): ICD-10-CM

## 2024-08-08 PROCEDURE — 1160F RVW MEDS BY RX/DR IN RCRD: CPT | Mod: CPTII,95,, | Performed by: NURSE PRACTITIONER

## 2024-08-08 PROCEDURE — 1159F MED LIST DOCD IN RCRD: CPT | Mod: CPTII,95,, | Performed by: NURSE PRACTITIONER

## 2024-08-08 PROCEDURE — 99214 OFFICE O/P EST MOD 30 MIN: CPT | Mod: 95,,, | Performed by: NURSE PRACTITIONER

## 2024-09-23 RX ORDER — VILAZODONE HYDROCHLORIDE 20 MG/1
TABLET ORAL
Qty: 30 TABLET | Refills: 2 | Status: SHIPPED | OUTPATIENT
Start: 2024-09-23

## 2024-09-27 ENCOUNTER — PATIENT MESSAGE (OUTPATIENT)
Dept: PSYCHIATRY | Facility: CLINIC | Age: 39
End: 2024-09-27
Payer: COMMERCIAL

## 2024-09-27 ENCOUNTER — OFFICE VISIT (OUTPATIENT)
Dept: PSYCHIATRY | Facility: CLINIC | Age: 39
End: 2024-09-27
Payer: COMMERCIAL

## 2024-09-27 DIAGNOSIS — F41.1 GAD (GENERALIZED ANXIETY DISORDER): Primary | ICD-10-CM

## 2024-09-27 DIAGNOSIS — F33.1 MODERATE EPISODE OF RECURRENT MAJOR DEPRESSIVE DISORDER: ICD-10-CM
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
## 2024-12-20 ENCOUNTER — OFFICE VISIT (OUTPATIENT)
Dept: PSYCHIATRY | Facility: CLINIC | Age: 39
End: 2024-12-20
Payer: COMMERCIAL

## 2024-12-20 DIAGNOSIS — F33.1 MODERATE EPISODE OF RECURRENT MAJOR DEPRESSIVE DISORDER: ICD-10-CM

## 2024-12-20 DIAGNOSIS — F41.1 GAD (GENERALIZED ANXIETY DISORDER): Primary | ICD-10-CM
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
## 2025-01-06 RX ORDER — VILAZODONE HYDROCHLORIDE 20 MG/1
TABLET ORAL
Qty: 30 TABLET | Refills: 2 | Status: SHIPPED | OUTPATIENT
Start: 2025-01-06

## 2025-04-08 RX ORDER — VILAZODONE HYDROCHLORIDE 20 MG/1
1 TABLET ORAL
Qty: 90 TABLET | Refills: 1 | Status: SHIPPED | OUTPATIENT
Start: 2025-04-08